# Patient Record
Sex: MALE | Race: WHITE | Employment: OTHER | URBAN - NONMETROPOLITAN AREA
[De-identification: names, ages, dates, MRNs, and addresses within clinical notes are randomized per-mention and may not be internally consistent; named-entity substitution may affect disease eponyms.]

---

## 2017-01-17 RX ORDER — ATENOLOL 25 MG/1
TABLET ORAL
Qty: 90 TABLET | Refills: 1 | Status: SHIPPED | OUTPATIENT
Start: 2017-01-17 | End: 2017-08-14 | Stop reason: SDUPTHER

## 2017-01-17 RX ORDER — AMLODIPINE BESYLATE 5 MG/1
TABLET ORAL
Qty: 180 TABLET | Refills: 1 | Status: SHIPPED | OUTPATIENT
Start: 2017-01-17 | End: 2017-08-14 | Stop reason: SDUPTHER

## 2017-04-25 ENCOUNTER — OFFICE VISIT (OUTPATIENT)
Dept: CARDIOLOGY | Age: 72
End: 2017-04-25

## 2017-04-25 VITALS
HEART RATE: 61 BPM | DIASTOLIC BLOOD PRESSURE: 80 MMHG | BODY MASS INDEX: 37.15 KG/M2 | HEIGHT: 65 IN | SYSTOLIC BLOOD PRESSURE: 134 MMHG | WEIGHT: 223 LBS

## 2017-04-25 DIAGNOSIS — I10 ESSENTIAL HYPERTENSION: Primary | ICD-10-CM

## 2017-04-25 DIAGNOSIS — I25.10 CORONARY ARTERY DISEASE INVOLVING NATIVE CORONARY ARTERY OF NATIVE HEART WITHOUT ANGINA PECTORIS: ICD-10-CM

## 2017-04-25 DIAGNOSIS — E78.00 PURE HYPERCHOLESTEROLEMIA: ICD-10-CM

## 2017-04-25 PROCEDURE — 99213 OFFICE O/P EST LOW 20 MIN: CPT | Performed by: INTERNAL MEDICINE

## 2017-04-25 PROCEDURE — 1123F ACP DISCUSS/DSCN MKR DOCD: CPT | Performed by: INTERNAL MEDICINE

## 2017-04-25 PROCEDURE — 4040F PNEUMOC VAC/ADMIN/RCVD: CPT | Performed by: INTERNAL MEDICINE

## 2017-04-25 PROCEDURE — G8427 DOCREV CUR MEDS BY ELIG CLIN: HCPCS | Performed by: INTERNAL MEDICINE

## 2017-04-25 PROCEDURE — 93000 ELECTROCARDIOGRAM COMPLETE: CPT | Performed by: INTERNAL MEDICINE

## 2017-04-25 PROCEDURE — 1036F TOBACCO NON-USER: CPT | Performed by: INTERNAL MEDICINE

## 2017-04-25 PROCEDURE — G8419 CALC BMI OUT NRM PARAM NOF/U: HCPCS | Performed by: INTERNAL MEDICINE

## 2017-04-25 PROCEDURE — 3017F COLORECTAL CA SCREEN DOC REV: CPT | Performed by: INTERNAL MEDICINE

## 2017-04-25 PROCEDURE — G8598 ASA/ANTIPLAT THER USED: HCPCS | Performed by: INTERNAL MEDICINE

## 2017-04-29 LAB
ALBUMIN SERPL-MCNC: 4.4 G/DL (ref 3.2–5.3)
ALK PHOSPHATASE: 81 IU/L (ref 35–121)
ALT SERPL-CCNC: 17 IU/L (ref 5–59)
ANION GAP SERPL CALCULATED.3IONS-SCNC: 11 MMOL/L
AST SERPL-CCNC: 15 IU/L (ref 10–42)
BILIRUB SERPL-MCNC: 0.5 MG/DL (ref 0.2–1.3)
BILIRUBIN DIRECT: 0.1 MG/DL (ref 0–0.2)
BUN BLDV-MCNC: 15 MG/DL (ref 10–20)
CALCIUM SERPL-MCNC: 9.6 MG/DL (ref 8.7–10.8)
CHLORIDE BLD-SCNC: 103 MMOL/L (ref 95–111)
CHOLESTEROL/HDL RATIO: 7.4
CHOLESTEROL: 297 MG/DL
CO2: 27 MMOL/L (ref 21–32)
CREAT SERPL-MCNC: 1 MG/DL (ref 0.5–1.3)
EGFR AFRICAN AMERICAN: 89
EGFR IF NONAFRICAN AMERICAN: 74
GLUCOSE: 87 MG/DL (ref 70–100)
HDLC SERPL-MCNC: 40 MG/DL (ref 40–60)
LDL CHOLESTEROL CALCULATED: 190 MG/DL
LDL/HDL RATIO: 4.8
POTASSIUM SERPL-SCNC: 4.3 MMOL/L (ref 3.5–5.4)
SODIUM BLD-SCNC: 137 MMOL/L (ref 134–147)
TOTAL PROTEIN: 7.2 G/DL (ref 5.8–8)
TRIGL SERPL-MCNC: 333 MG/DL
VLDLC SERPL CALC-MCNC: 67 MG/DL

## 2017-05-02 ENCOUNTER — TELEPHONE (OUTPATIENT)
Dept: CARDIOLOGY | Age: 72
End: 2017-05-02

## 2017-08-14 RX ORDER — AMLODIPINE BESYLATE 5 MG/1
TABLET ORAL
Qty: 180 TABLET | Refills: 1 | Status: SHIPPED | OUTPATIENT
Start: 2017-08-14 | End: 2018-01-13 | Stop reason: SDUPTHER

## 2017-08-14 RX ORDER — ATENOLOL 25 MG/1
TABLET ORAL
Qty: 90 TABLET | Refills: 1 | Status: SHIPPED | OUTPATIENT
Start: 2017-08-14 | End: 2017-08-31 | Stop reason: ALTCHOICE

## 2018-01-15 RX ORDER — AMLODIPINE BESYLATE 5 MG/1
TABLET ORAL
Qty: 180 TABLET | Refills: 0 | Status: SHIPPED | OUTPATIENT
Start: 2018-01-15 | End: 2018-03-23 | Stop reason: SDUPTHER

## 2018-02-28 ENCOUNTER — HOSPITAL ENCOUNTER (OUTPATIENT)
Dept: NON INVASIVE DIAGNOSTICS | Age: 73
Discharge: HOME | End: 2018-02-28
Payer: MEDICARE

## 2018-02-28 VITALS — BODY MASS INDEX: 37.49 KG/M2 | HEIGHT: 65 IN | WEIGHT: 225 LBS

## 2018-02-28 PROCEDURE — 78452 HT MUSCLE IMAGE SPECT MULT: CPT

## 2018-02-28 PROCEDURE — A9500 TC99M SESTAMIBI: HCPCS | Performed by: FAMILY MEDICINE

## 2018-02-28 PROCEDURE — 3430000000 HC RX DIAGNOSTIC RADIOPHARMACEUTICAL: Performed by: FAMILY MEDICINE

## 2018-02-28 PROCEDURE — 6360000002 HC RX W HCPCS

## 2018-02-28 PROCEDURE — 93017 CV STRESS TEST TRACING ONLY: CPT | Performed by: INTERNAL MEDICINE

## 2018-02-28 RX ADMIN — Medication 9.5 MILLICURIE: at 10:28

## 2018-02-28 RX ADMIN — Medication 31 MILLICURIE: at 11:50

## 2018-03-01 ENCOUNTER — OFFICE VISIT (OUTPATIENT)
Dept: CARDIOLOGY CLINIC | Age: 73
End: 2018-03-01
Payer: MEDICARE

## 2018-03-01 VITALS
DIASTOLIC BLOOD PRESSURE: 76 MMHG | HEIGHT: 65 IN | BODY MASS INDEX: 37.72 KG/M2 | HEART RATE: 67 BPM | WEIGHT: 226.38 LBS | SYSTOLIC BLOOD PRESSURE: 109 MMHG

## 2018-03-01 DIAGNOSIS — R06.09 DOE (DYSPNEA ON EXERTION): Primary | ICD-10-CM

## 2018-03-01 DIAGNOSIS — R60.0 LOWER EXTREMITY EDEMA: ICD-10-CM

## 2018-03-01 PROCEDURE — 3017F COLORECTAL CA SCREEN DOC REV: CPT | Performed by: INTERNAL MEDICINE

## 2018-03-01 PROCEDURE — G8417 CALC BMI ABV UP PARAM F/U: HCPCS | Performed by: INTERNAL MEDICINE

## 2018-03-01 PROCEDURE — 4040F PNEUMOC VAC/ADMIN/RCVD: CPT | Performed by: INTERNAL MEDICINE

## 2018-03-01 PROCEDURE — G8484 FLU IMMUNIZE NO ADMIN: HCPCS | Performed by: INTERNAL MEDICINE

## 2018-03-01 PROCEDURE — 99214 OFFICE O/P EST MOD 30 MIN: CPT | Performed by: INTERNAL MEDICINE

## 2018-03-01 PROCEDURE — G8598 ASA/ANTIPLAT THER USED: HCPCS | Performed by: INTERNAL MEDICINE

## 2018-03-01 PROCEDURE — 1036F TOBACCO NON-USER: CPT | Performed by: INTERNAL MEDICINE

## 2018-03-01 PROCEDURE — G8427 DOCREV CUR MEDS BY ELIG CLIN: HCPCS | Performed by: INTERNAL MEDICINE

## 2018-03-01 PROCEDURE — 1123F ACP DISCUSS/DSCN MKR DOCD: CPT | Performed by: INTERNAL MEDICINE

## 2018-03-01 RX ORDER — FUROSEMIDE 40 MG/1
40 TABLET ORAL DAILY
Qty: 60 TABLET | Refills: 3 | Status: SHIPPED | OUTPATIENT
Start: 2018-03-01 | End: 2018-04-11 | Stop reason: ALTCHOICE

## 2018-03-01 NOTE — PROGRESS NOTES
[facility] PROFESSIONAL SERVS  [facility] [address] [address]  Dept: [phone]  Dept Fax: [phone]  Loc: [phone]    Visit Date: 3/1/2018    Mr. Russel Moon is a 71 y.o. male  who presented for:  Chief Complaint   Patient presents with    Check-Up    Hypertension    Coronary Artery Disease       HPI:   HPI   70 yo M hx of non-obstructive CAD 2012, HTN, HLD who was previously being treated with inhalers, but is now off; had a stress test yesterday that was negative for ischemia. /76, HR 67. ASA/BB/CCB/NTG SL. No angina. No LOC. No palpitations. Apparently had an atherectomy of his \"widowmaker\" -  30 years ago. His EF is 72% on the Lexiscan, but no recent TTE. No chest pain, angina, DELACRUZ, orthopnea, PND, sob at rest, palpitations, LE edema, or syncope. Current Outpatient Prescriptions:     amLODIPine (NORVASC) 5 MG tablet, TAKE 1 TABLET TWICE DAILY, Disp: 180 tablet, Rfl: 0    metoprolol tartrate (LOPRESSOR) 25 MG tablet, Take 1 tablet by mouth 2 times daily, Disp: 180 tablet, Rfl: 3    nitroGLYCERIN (NITROSTAT) 0.4 MG SL tablet, Place 1 tablet under the tongue every 5 minutes as needed for Chest pain, Disp: 25 tablet, Rfl: 3    niacin 500 MG tablet, Take 500 mg by mouth 2 times daily (with meals), Disp: , Rfl:     finasteride (PROSCAR) 5 MG tablet, Take 1 tablet by mouth daily. , Disp: 30 tablet, Rfl: 6    terazosin (HYTRIN) 5 MG capsule, Take 1 capsule by mouth nightly., Disp: 30 capsule, Rfl: 6    aspirin 81 MG tablet, Take 81 mg by mouth daily. , Disp: , Rfl:     Naproxen Sodium (ALEVE PO), Take  by mouth as needed. , Disp: , Rfl:     Past Medical History  Mary Vo  has a past medical history of Arteriosclerotic heart disease; Bradycardia; Difficulty urinating; Fatigue; Frequency of urination; Hearing loss; Heartburn; Hyperlipidemia; Hypertension; and Leg cramps.     Social History  Mary Vo  reports that he quit smoking about 34 years ago. His smoking use included Cigarettes. He has a 80.00 pack-year smoking history. He has never used smokeless tobacco. He reports that he drinks alcohol. He reports that he does not use drugs. Family History  Peder Gilda family history includes Heart Disease in his father and mother; Heart Disease (age of onset: 43) in his brother; Heart Disease (age of onset: 50) in his brother; Heart Disease (age of onset: 62) in his brother; Heart Disease (age of onset: 79) in his brother; High Cholesterol in his brother, brother, brother, brother, father, and mother. There is no family history of bicuspid aortic valve, aneurysms, heart transplant, pacemakers, defibrillators, or sudden cardiac death. Past Surgical History   Past Surgical History:   Procedure Laterality Date    ANGIOPLASTY      ATHERECTOMY  1994    artherectomy of the LAD    CARDIAC CATHETERIZATION  8/2008    50-60% stenosis in the mid rt cornary artery which was a supradominant system, normal LV size EF 65%     CARDIAC CATHETERIZATION  8/11/08    50-60% stenosis in mid RCA. 40-50% proximal circumflex stenosis. normal LV size and systolic funct. EF 65%. nor regional wall motion abnormalities. no MR.    CARDIOVASCULAR STRESS TEST  8-6-09    neg for myocardial ischemia. no evidence of induced ischemia, infarct, or scar. normal wall motion/contractility. EF 66%. no CP or ST segment chg w/admin of Persantine.  CARDIOVASCULAR STRESS TEST  9-15-11    neg Persantine stress ryanne for myocardial ischemia. no evidence of induced ischemia,infact, or scar. normal wall motion/contractility. EF 70%. no CP or EKG chg w/admin of Persantine.  CARDIOVASCULAR STRESS TEST  9/9/10    neg Persantine stress test for myocardial ischemia. no evidence of induced ischemia,infarct, or scar. normal wall motion/contractility. EF 75%. no CP or ischemic EKG chg w/admin of Persantine.     CARDIOVASCULAR STRESS TEST  12/4/07    normal cardiolite myocardial scan w/spect imaging. LV EF 66%.  CARDIOVASCULAR STRESS TEST  7/21/05    no evidence of induced myocardial ischemia. normal LV. EF 62%. normal LV systolic volume. no myocardial scars or wall motion abnormalities.  CARDIOVASCULAR STRESS TEST  7/20/04    neg for myocardial ischemia. no evidence of ischemia,infarct. EF 60%. neg graded exercise for myocardial ischemia to 91% predicted target hr. pt no CP or EKG chg. 12.9 MET workload achieved.  CARDIOVASCULAR STRESS TEST  6/18/02    normal cardiolite myocardial scan. no appreciable chg from 9-9-99. LV Ef 73%.  EKG 12 LEAD (HISTORICAL)  8-10-10    shows sinus bracycardia, minimal voltage criteria for LV hypertrophy, non specific T-wave changes    HERNIA REPAIR      PTCA  2000    PTCA of a lg dominant circumflex. severe CAD. LV EF 59%.  TRANSTHORACIC ECHOCARDIOGRAM  8/12/09    normal LV and wall motion. EF 55%. both atria are normal in size. valvular structures are grossly within limits w/o stenosis. gross intracardic thrombi or mass were not noted. mild M&T  and pulmonic insufficencies. valvular stenosis not substantiated. no sign pericardial effusion. Subjective:     Review of Systems   Constitutional: Negative for activity change, appetite change, chills and fatigue. HENT: Negative for congestion, sinus pressure, sneezing and sore throat. Eyes: Negative for pain, discharge, redness and itching. Respiratory: Negative for apnea, cough, chest tightness and shortness of breath. Gastrointestinal: Negative for abdominal distention, abdominal pain, blood in stool, constipation, diarrhea and nausea. Endocrine: Negative for cold intolerance, heat intolerance, polydipsia and polyphagia. Genitourinary: Negative for dysuria, enuresis, flank pain and hematuria. Musculoskeletal: Negative for arthralgias, back pain, joint swelling and neck pain. Neurological: Negative for dizziness, syncope, numbness and headaches.    Psychiatric/Behavioral: Negative for agitation, confusion, decreased concentration and dysphoric mood. Objective:     /76   Pulse 67   Ht 5' 5\" (1.651 m)   Wt 226 lb 6 oz (102.7 kg)   BMI 37.67 kg/m²     Wt Readings from Last 3 Encounters:   03/01/18 226 lb 6 oz (102.7 kg)   02/28/18 225 lb (102.1 kg)   04/25/17 223 lb (101.2 kg)     BP Readings from Last 3 Encounters:   03/01/18 109/76   04/25/17 134/80   06/28/16 116/68     Physical Exam   Constitutional: He is oriented to person, place, and time. He appears well-developed and well-nourished. HENT:   Head: Normocephalic and atraumatic. Eyes: EOM are normal. Pupils are equal, round, and reactive to light. Neck: Normal range of motion. Neck supple. No JVD present. Cardiovascular: Normal rate, regular rhythm, normal heart sounds and intact distal pulses. No murmur heard. Pulmonary/Chest: Effort normal and breath sounds normal. No respiratory distress. He has no wheezes. He has no rales. Abdominal: Soft. Bowel sounds are normal. He exhibits no distension. There is no tenderness. Musculoskeletal: Normal range of motion. He exhibits no edema. Neurological: He is alert and oriented to person, place, and time. No cranial nerve deficit. Coordination normal.   Skin: Skin is warm and dry. Psychiatric: He has a normal mood and affect. Nursing note and vitals reviewed.       No results found for: CKTOTAL, CKMB, CKMBINDEX    Lab Results   Component Value Date    WBC 6.3 02/13/2012    RBC 4.78 02/13/2012    HCT 42.4 02/13/2012    MCV 88.7 02/13/2012    MCH 30.9 02/13/2012    MCHC 34.9 02/13/2012    RDW 13.6 02/13/2012     02/13/2012    MPV 11.0 02/13/2012       Lab Results   Component Value Date     04/29/2017    K 4.3 04/29/2017     04/29/2017    CO2 27 04/29/2017    BUN 15 04/29/2017    LABALBU 4.4 04/29/2017    LABALBU 4.3 02/13/2012    CREATININE 1.0 04/29/2017    CALCIUM 9.6 04/29/2017    LABGLOM 75 02/13/2012    GLUCOSE 87 04/29/2017       Lab Results Component Value Date    ALKPHOS 81 04/29/2017    ALKPHOS 83 07/09/2016    ALT 17 04/29/2017    AST 15 04/29/2017    PROT 7.2 04/29/2017    PROT 7.1 07/09/2016    BILITOT 0.5 04/29/2017    BILIDIR 0.1 04/29/2017    LABALBU 4.4 04/29/2017    LABALBU 4.3 02/13/2012       No results found for: MG    No results found for: INR, PROTIME      No results found for: LABA1C    Lab Results   Component Value Date    TRIG 333 04/29/2017    HDL 40 04/29/2017    LDLCALC 190 04/29/2017    LDLDIRECT 196 12/12/2015    LABVLDL 67 04/29/2017       No results found for: TSH        Assessment/Plan   DELACRUZ - check TTE to evaluate function, valves, pulmonary pressures. May need repeat Pulmonology assessment. Lasix 40 mg q day. BMP in 1 week. The patient was advised on risk/benefits of the new Rx and she agreed to proceed with the medication(s). Will likely need to see CHF RN.       Disposition:  2-4 weeks    Electronically signed by Sami Collado MD   3/1/2018 at 2:28 PM

## 2018-03-05 ASSESSMENT — ENCOUNTER SYMPTOMS
EYE REDNESS: 0
DIARRHEA: 0
BACK PAIN: 0
ABDOMINAL DISTENTION: 0
BLOOD IN STOOL: 0
SINUS PRESSURE: 0
COUGH: 0
EYE PAIN: 0
CONSTIPATION: 0
EYE ITCHING: 0
EYE DISCHARGE: 0
SORE THROAT: 0
CHEST TIGHTNESS: 0
ABDOMINAL PAIN: 0
NAUSEA: 0
APNEA: 0
SHORTNESS OF BREATH: 0

## 2018-03-14 ENCOUNTER — HOSPITAL ENCOUNTER (OUTPATIENT)
Dept: NON INVASIVE DIAGNOSTICS | Age: 73
Discharge: HOME | End: 2018-03-14
Payer: MEDICARE

## 2018-03-14 DIAGNOSIS — R60.0 LOWER EXTREMITY EDEMA: ICD-10-CM

## 2018-03-14 LAB
LV EF: 63 %
LVEF MODALITY: NORMAL

## 2018-03-14 PROCEDURE — 93306 TTE W/DOPPLER COMPLETE: CPT

## 2018-03-23 RX ORDER — AMLODIPINE BESYLATE 5 MG/1
TABLET ORAL
Qty: 180 TABLET | Refills: 0 | Status: SHIPPED | OUTPATIENT
Start: 2018-03-23 | End: 2018-03-27 | Stop reason: SDUPTHER

## 2018-03-29 RX ORDER — AMLODIPINE BESYLATE 5 MG/1
TABLET ORAL
Qty: 180 TABLET | Refills: 0 | Status: SHIPPED | OUTPATIENT
Start: 2018-03-29 | End: 2018-06-04 | Stop reason: SDUPTHER

## 2018-04-09 ENCOUNTER — OFFICE VISIT (OUTPATIENT)
Dept: CARDIOLOGY CLINIC | Age: 73
End: 2018-04-09
Payer: MEDICARE

## 2018-04-09 ENCOUNTER — TELEPHONE (OUTPATIENT)
Dept: CARDIOLOGY CLINIC | Age: 73
End: 2018-04-09

## 2018-04-09 VITALS
WEIGHT: 225 LBS | HEART RATE: 60 BPM | SYSTOLIC BLOOD PRESSURE: 110 MMHG | DIASTOLIC BLOOD PRESSURE: 68 MMHG | BODY MASS INDEX: 37.49 KG/M2 | HEIGHT: 65 IN

## 2018-04-09 DIAGNOSIS — I50.32 CHRONIC DIASTOLIC CONGESTIVE HEART FAILURE, NYHA CLASS 2 (HCC): Primary | ICD-10-CM

## 2018-04-09 PROCEDURE — 3017F COLORECTAL CA SCREEN DOC REV: CPT | Performed by: INTERNAL MEDICINE

## 2018-04-09 PROCEDURE — G8427 DOCREV CUR MEDS BY ELIG CLIN: HCPCS | Performed by: INTERNAL MEDICINE

## 2018-04-09 PROCEDURE — 1123F ACP DISCUSS/DSCN MKR DOCD: CPT | Performed by: INTERNAL MEDICINE

## 2018-04-09 PROCEDURE — 4040F PNEUMOC VAC/ADMIN/RCVD: CPT | Performed by: INTERNAL MEDICINE

## 2018-04-09 PROCEDURE — G8598 ASA/ANTIPLAT THER USED: HCPCS | Performed by: INTERNAL MEDICINE

## 2018-04-09 PROCEDURE — 99213 OFFICE O/P EST LOW 20 MIN: CPT | Performed by: INTERNAL MEDICINE

## 2018-04-09 PROCEDURE — G8417 CALC BMI ABV UP PARAM F/U: HCPCS | Performed by: INTERNAL MEDICINE

## 2018-04-09 PROCEDURE — 1036F TOBACCO NON-USER: CPT | Performed by: INTERNAL MEDICINE

## 2018-04-09 RX ORDER — FUROSEMIDE 40 MG/1
40 TABLET ORAL 2 TIMES DAILY
Qty: 60 TABLET | Refills: 3 | Status: SHIPPED | OUTPATIENT
Start: 2018-04-09 | End: 2018-07-30

## 2018-04-09 ASSESSMENT — ENCOUNTER SYMPTOMS
CONSTIPATION: 0
NAUSEA: 0
SINUS PRESSURE: 0
EYE PAIN: 0
ABDOMINAL PAIN: 0
BLOOD IN STOOL: 0
ABDOMINAL DISTENTION: 0
CHEST TIGHTNESS: 0
EYE REDNESS: 0
SORE THROAT: 0
COUGH: 0
SHORTNESS OF BREATH: 0
EYE DISCHARGE: 0
BACK PAIN: 0
DIARRHEA: 0
EYE ITCHING: 0
APNEA: 0

## 2018-04-11 ENCOUNTER — OFFICE VISIT (OUTPATIENT)
Dept: CARDIOLOGY CLINIC | Age: 73
End: 2018-04-11
Payer: MEDICARE

## 2018-04-11 VITALS
HEIGHT: 64 IN | HEART RATE: 66 BPM | WEIGHT: 228 LBS | OXYGEN SATURATION: 96 % | DIASTOLIC BLOOD PRESSURE: 82 MMHG | SYSTOLIC BLOOD PRESSURE: 138 MMHG | BODY MASS INDEX: 38.93 KG/M2

## 2018-04-11 DIAGNOSIS — I50.32 CHF (CONGESTIVE HEART FAILURE), NYHA CLASS II, CHRONIC, DIASTOLIC (HCC): Primary | ICD-10-CM

## 2018-04-11 PROCEDURE — 99213 OFFICE O/P EST LOW 20 MIN: CPT | Performed by: NURSE PRACTITIONER

## 2018-04-11 PROCEDURE — 1123F ACP DISCUSS/DSCN MKR DOCD: CPT | Performed by: NURSE PRACTITIONER

## 2018-04-11 PROCEDURE — G8598 ASA/ANTIPLAT THER USED: HCPCS | Performed by: NURSE PRACTITIONER

## 2018-04-11 PROCEDURE — 1036F TOBACCO NON-USER: CPT | Performed by: NURSE PRACTITIONER

## 2018-04-11 PROCEDURE — 3017F COLORECTAL CA SCREEN DOC REV: CPT | Performed by: NURSE PRACTITIONER

## 2018-04-11 PROCEDURE — G8427 DOCREV CUR MEDS BY ELIG CLIN: HCPCS | Performed by: NURSE PRACTITIONER

## 2018-04-11 PROCEDURE — 4040F PNEUMOC VAC/ADMIN/RCVD: CPT | Performed by: NURSE PRACTITIONER

## 2018-04-11 PROCEDURE — G8417 CALC BMI ABV UP PARAM F/U: HCPCS | Performed by: NURSE PRACTITIONER

## 2018-04-11 RX ORDER — POTASSIUM CHLORIDE 750 MG/1
10 TABLET, EXTENDED RELEASE ORAL DAILY
Qty: 60 TABLET | Refills: 3 | Status: SHIPPED | OUTPATIENT
Start: 2018-04-11 | End: 2018-08-13 | Stop reason: SDUPTHER

## 2018-04-11 RX ORDER — LISINOPRIL 2.5 MG/1
2.5 TABLET ORAL DAILY
Qty: 30 TABLET | Refills: 3 | Status: SHIPPED | OUTPATIENT
Start: 2018-04-11 | End: 2018-07-26

## 2018-04-11 ASSESSMENT — ENCOUNTER SYMPTOMS
COUGH: 0
ABDOMINAL PAIN: 0
CHEST TIGHTNESS: 0
SHORTNESS OF BREATH: 1
WHEEZING: 0
APNEA: 0
COLOR CHANGE: 0
ABDOMINAL DISTENTION: 1
NAUSEA: 0

## 2018-05-01 ENCOUNTER — HOSPITAL ENCOUNTER (OUTPATIENT)
Dept: PULMONOLOGY | Age: 73
Discharge: HOME | End: 2018-05-01
Payer: MEDICARE

## 2018-05-01 PROCEDURE — 94060 EVALUATION OF WHEEZING: CPT

## 2018-05-01 PROCEDURE — 94729 DIFFUSING CAPACITY: CPT

## 2018-05-01 PROCEDURE — 94726 PLETHYSMOGRAPHY LUNG VOLUMES: CPT

## 2018-05-23 ENCOUNTER — OFFICE VISIT (OUTPATIENT)
Dept: CARDIOLOGY CLINIC | Age: 73
End: 2018-05-23
Payer: MEDICARE

## 2018-05-23 VITALS
WEIGHT: 216.8 LBS | DIASTOLIC BLOOD PRESSURE: 80 MMHG | HEART RATE: 60 BPM | SYSTOLIC BLOOD PRESSURE: 130 MMHG | HEIGHT: 65 IN | BODY MASS INDEX: 36.12 KG/M2

## 2018-05-23 DIAGNOSIS — I50.32 CHRONIC DIASTOLIC CONGESTIVE HEART FAILURE, NYHA CLASS 2 (HCC): ICD-10-CM

## 2018-05-23 DIAGNOSIS — R06.02 SOB (SHORTNESS OF BREATH): Primary | ICD-10-CM

## 2018-05-23 PROCEDURE — G8598 ASA/ANTIPLAT THER USED: HCPCS | Performed by: INTERNAL MEDICINE

## 2018-05-23 PROCEDURE — G8427 DOCREV CUR MEDS BY ELIG CLIN: HCPCS | Performed by: INTERNAL MEDICINE

## 2018-05-23 PROCEDURE — 3017F COLORECTAL CA SCREEN DOC REV: CPT | Performed by: INTERNAL MEDICINE

## 2018-05-23 PROCEDURE — 99213 OFFICE O/P EST LOW 20 MIN: CPT | Performed by: INTERNAL MEDICINE

## 2018-05-23 PROCEDURE — G8417 CALC BMI ABV UP PARAM F/U: HCPCS | Performed by: INTERNAL MEDICINE

## 2018-05-23 PROCEDURE — 1123F ACP DISCUSS/DSCN MKR DOCD: CPT | Performed by: INTERNAL MEDICINE

## 2018-05-23 PROCEDURE — 4040F PNEUMOC VAC/ADMIN/RCVD: CPT | Performed by: INTERNAL MEDICINE

## 2018-05-23 PROCEDURE — 1036F TOBACCO NON-USER: CPT | Performed by: INTERNAL MEDICINE

## 2018-05-28 ASSESSMENT — ENCOUNTER SYMPTOMS
ABDOMINAL PAIN: 0
EYE REDNESS: 0
ABDOMINAL DISTENTION: 0
EYE PAIN: 0
EYE ITCHING: 0
SHORTNESS OF BREATH: 0
COUGH: 0
SORE THROAT: 0
CHEST TIGHTNESS: 0
EYE DISCHARGE: 0
BLOOD IN STOOL: 0
DIARRHEA: 0
APNEA: 0
SINUS PRESSURE: 0
NAUSEA: 0
CONSTIPATION: 0
BACK PAIN: 0

## 2018-06-05 RX ORDER — AMLODIPINE BESYLATE 5 MG/1
TABLET ORAL
Qty: 180 TABLET | Refills: 0 | Status: SHIPPED | OUTPATIENT
Start: 2018-06-05 | End: 2018-07-26 | Stop reason: DRUGHIGH

## 2018-06-12 ENCOUNTER — OFFICE VISIT (OUTPATIENT)
Dept: CARDIOLOGY CLINIC | Age: 73
End: 2018-06-12
Payer: MEDICARE

## 2018-06-12 VITALS
HEIGHT: 65 IN | OXYGEN SATURATION: 92 % | WEIGHT: 214 LBS | DIASTOLIC BLOOD PRESSURE: 62 MMHG | SYSTOLIC BLOOD PRESSURE: 94 MMHG | HEART RATE: 75 BPM | BODY MASS INDEX: 35.65 KG/M2

## 2018-06-12 DIAGNOSIS — I50.32 CHF (CONGESTIVE HEART FAILURE), NYHA CLASS II, CHRONIC, DIASTOLIC (HCC): Primary | ICD-10-CM

## 2018-06-12 PROCEDURE — 1036F TOBACCO NON-USER: CPT | Performed by: NURSE PRACTITIONER

## 2018-06-12 PROCEDURE — G8417 CALC BMI ABV UP PARAM F/U: HCPCS | Performed by: NURSE PRACTITIONER

## 2018-06-12 PROCEDURE — 1123F ACP DISCUSS/DSCN MKR DOCD: CPT | Performed by: NURSE PRACTITIONER

## 2018-06-12 PROCEDURE — 99213 OFFICE O/P EST LOW 20 MIN: CPT | Performed by: NURSE PRACTITIONER

## 2018-06-12 PROCEDURE — 4040F PNEUMOC VAC/ADMIN/RCVD: CPT | Performed by: NURSE PRACTITIONER

## 2018-06-12 PROCEDURE — 3017F COLORECTAL CA SCREEN DOC REV: CPT | Performed by: NURSE PRACTITIONER

## 2018-06-12 PROCEDURE — G8598 ASA/ANTIPLAT THER USED: HCPCS | Performed by: NURSE PRACTITIONER

## 2018-06-12 PROCEDURE — G8427 DOCREV CUR MEDS BY ELIG CLIN: HCPCS | Performed by: NURSE PRACTITIONER

## 2018-06-12 ASSESSMENT — ENCOUNTER SYMPTOMS
WHEEZING: 0
APNEA: 0
ABDOMINAL PAIN: 0
COUGH: 0
ABDOMINAL DISTENTION: 1
COLOR CHANGE: 0
SHORTNESS OF BREATH: 1
CHEST TIGHTNESS: 0
NAUSEA: 0

## 2018-07-09 ENCOUNTER — TELEPHONE (OUTPATIENT)
Dept: CARDIOLOGY CLINIC | Age: 73
End: 2018-07-09

## 2018-07-09 NOTE — TELEPHONE ENCOUNTER
Spoke to Jerald Paris and updated her regarding medication changes. She states that she will call if he is still fatigue and if BP is >130.
Patient

## 2018-07-16 ENCOUNTER — TELEPHONE (OUTPATIENT)
Dept: CARDIAC REHAB | Age: 73
End: 2018-07-16

## 2018-07-16 NOTE — TELEPHONE ENCOUNTER
Left message with patients wife regarding 2 part nutrition class workshops for diastolic HF patients that will be starting this month. Openings available on August 6th and 13th. Wife states she will relay information to [place] and will call back if they will be able to come on those dates. Office number provided.

## 2018-07-19 ENCOUNTER — TELEPHONE (OUTPATIENT)
Dept: CARDIOLOGY CLINIC | Age: 73
End: 2018-07-19

## 2018-07-25 ENCOUNTER — TELEPHONE (OUTPATIENT)
Dept: CARDIOLOGY CLINIC | Age: 73
End: 2018-07-25

## 2018-07-25 DIAGNOSIS — I50.32 CHF (CONGESTIVE HEART FAILURE), NYHA CLASS II, CHRONIC, DIASTOLIC (HCC): Primary | ICD-10-CM

## 2018-07-25 NOTE — TELEPHONE ENCOUNTER
BMP today or tomorrow - order was placed  I would prefer him to take the Metoprolol. We can decrease to 12.5 mg bid, stop Lisinopril 2.5 mg daily, decrease Amlodipine to 5 mg daily. Decrease Lasix to 20 mg daily. We will see what his labs show and make further recommendations from there.  Drink to thirst.

## 2018-07-26 NOTE — TELEPHONE ENCOUNTER
Spoke to Mario Alberto Rizo regarding new orders and she verbalized understanding. She states that he will be getting his labs done today at [facility] in [place]. Orders faxed to the lab.

## 2018-07-27 LAB
ANION GAP SERPL CALCULATED.3IONS-SCNC: 19 MEQ/L (ref 10–19)
BUN BLDV-MCNC: 39 MG/DL (ref 8–23)
CALCIUM SERPL-MCNC: 9 MG/DL (ref 8.5–10.5)
CHLORIDE BLD-SCNC: 96 MEQ/L (ref 95–107)
CO2: 22 MEQ/L (ref 19–31)
CREAT SERPL-MCNC: 1.6 MG/DL (ref 0.8–1.4)
EGFR AFRICAN AMERICAN: 49.5 ML/MIN/1.73 M2
EGFR IF NONAFRICAN AMERICAN: 42.7 ML/MIN/1.73 M2
GLUCOSE: 87 MG/DL (ref 70–99)
POTASSIUM SERPL-SCNC: 4.4 MEQ/L (ref 3.5–5.4)
SODIUM BLD-SCNC: 137 MEQ/L (ref 135–146)

## 2018-08-07 LAB
ANION GAP SERPL CALCULATED.3IONS-SCNC: 13 MEQ/L (ref 10–19)
BUN BLDV-MCNC: 19 MG/DL (ref 8–23)
CALCIUM SERPL-MCNC: 9.2 MG/DL (ref 8.5–10.5)
CHLORIDE BLD-SCNC: 102 MEQ/L (ref 95–107)
CO2: 23 MEQ/L (ref 19–31)
CREAT SERPL-MCNC: 1.1 MG/DL (ref 0.8–1.4)
EGFR AFRICAN AMERICAN: 77.9 ML/MIN/1.73 M2
EGFR IF NONAFRICAN AMERICAN: 67.2 ML/MIN/1.73 M2
GLUCOSE: 92 MG/DL (ref 70–99)
POTASSIUM SERPL-SCNC: 4.6 MEQ/L (ref 3.5–5.4)
SODIUM BLD-SCNC: 138 MEQ/L (ref 135–146)

## 2018-08-07 RX ORDER — FUROSEMIDE 40 MG/1
40 TABLET ORAL DAILY
COMMUNITY
End: 2018-09-10 | Stop reason: SDUPTHER

## 2018-08-07 RX ORDER — AMLODIPINE BESYLATE 5 MG/1
5 TABLET ORAL DAILY
COMMUNITY
End: 2018-08-08 | Stop reason: SDUPTHER

## 2018-08-08 RX ORDER — AMLODIPINE BESYLATE 5 MG/1
TABLET ORAL
Qty: 180 TABLET | Refills: 1 | Status: SHIPPED | OUTPATIENT
Start: 2018-08-08 | End: 2018-07-26

## 2018-08-14 RX ORDER — POTASSIUM CHLORIDE 750 MG/1
TABLET, EXTENDED RELEASE ORAL
Qty: 90 TABLET | Refills: 0 | Status: SHIPPED | OUTPATIENT
Start: 2018-08-14

## 2018-08-21 ENCOUNTER — TELEPHONE (OUTPATIENT)
Dept: CARDIOLOGY CLINIC | Age: 73
End: 2018-08-21

## 2018-08-21 NOTE — TELEPHONE ENCOUNTER
Labs look good. You may let him know. Results for Xuan Hi (MRN [id number]) as of 8/21/2018 16:39   Ref.  Range 7/26/2018 13:06 8/6/2018 16:08   Sodium Latest Ref Range: 135 - 146 mEq/L 137 138   Potassium Latest Ref Range: 3.5 - 5.4 mEq/L 4.4 4.6   Chloride Latest Ref Range: 95 - 107 mEq/L 96 102   CO2 Latest Ref Range: 19 - 31 mEq/L 22 23   BUN Latest Ref Range: 8 - 23 mg/dL 39 (H) 19   Creatinine Latest Ref Range: 0.8 - 1.4 mg/dL 1.6 (H) 1.1   Anion Gap Latest Ref Range: 10 - 19 mEq/L 19 13   eGFR African American Latest Ref Range: >59 mL/min/1.73 m2 49.5 (L) 77.9   EGFR IF NonAfrican American Latest Ref Range: >59 mL/min/1.73 m2 42.7 (L) 67.2   Glucose Latest Ref Range: 70 - 99 mg/dL 87 92   Calcium Latest Ref Range: 8.5 - 10.5 mg/dL 9.0 9.2

## 2018-08-21 NOTE — TELEPHONE ENCOUNTER
Patient called in stating that he has been experiencing lower back pain that in intermittent x 1 week. He states that he wasn't sure if it was related to a pulled muscle or if it is kidney related. Baseline weight is 200, today he was 200, Monday was 201, Sunday was 199, and Saturday was 198. Patient denies SOB, cough, swelling, bloating, orthopnea, increase in fatigue, dizziness, bruising or redness to that area, or history of falls in the last week. Medication reviewed and states that he has continued to take his medication as prescribed. Recent BP's: 106/70, 116/85, and 97/70. Patient was curious about his labs he had on 8/6/18? It was asked if he had contacted his PCP regarding this pain issue. Patient states that he has not. Stated that this information would be given to [facility] and that it the pain should be addressed with Dr. Tesha Currie.

## 2018-09-10 ENCOUNTER — OFFICE VISIT (OUTPATIENT)
Dept: CARDIOLOGY CLINIC | Age: 73
End: 2018-09-10
Payer: MEDICARE

## 2018-09-10 VITALS
BODY MASS INDEX: 34.32 KG/M2 | OXYGEN SATURATION: 94 % | WEIGHT: 206 LBS | HEART RATE: 62 BPM | HEIGHT: 65 IN | DIASTOLIC BLOOD PRESSURE: 79 MMHG | SYSTOLIC BLOOD PRESSURE: 120 MMHG

## 2018-09-10 DIAGNOSIS — I50.32 CHF (CONGESTIVE HEART FAILURE), NYHA CLASS II, CHRONIC, DIASTOLIC (HCC): Primary | ICD-10-CM

## 2018-09-10 PROCEDURE — 99213 OFFICE O/P EST LOW 20 MIN: CPT | Performed by: NURSE PRACTITIONER

## 2018-09-10 RX ORDER — AMLODIPINE BESYLATE 5 MG/1
5 TABLET ORAL DAILY
COMMUNITY

## 2018-09-10 RX ORDER — FUROSEMIDE 40 MG/1
40 TABLET ORAL DAILY
Qty: 90 TABLET | Refills: 6 | Status: CANCELLED | OUTPATIENT
Start: 2018-09-10

## 2018-09-10 RX ORDER — FUROSEMIDE 40 MG/1
40 TABLET ORAL DAILY
Qty: 30 TABLET | Refills: 3 | Status: SHIPPED | OUTPATIENT
Start: 2018-09-10

## 2018-09-10 ASSESSMENT — ENCOUNTER SYMPTOMS
COUGH: 0
ABDOMINAL PAIN: 0
CHEST TIGHTNESS: 0
SHORTNESS OF BREATH: 0
RESPIRATORY NEGATIVE: 1
ABDOMINAL DISTENTION: 0

## 2018-09-10 NOTE — PROGRESS NOTES
Heart Failure Clinic       Visit Date: 9/10/2018  Cardiologist:  Dr. Selina Anaya  Primary Care Physician: Dr. Moris Zhu MD    Zaire Christine is a 68 y.o. male who presents today for:  Chief Complaint   Patient presents with    Congestive Heart Failure       HPI:   Zaire Christine is a 68 y.o. male who presents to the office for a follow up  visit in the heart failure clinic. He has no CHF c/o  Not adding any salt - his wife cooks for him - weight has been coming down - he is eating well (10 # weight loss since May)    No Le swelling issues - no needed extra diuretics       He did have episode of lightheadedness and low BP - meds were adjusted and he has been doing great since     Still works PT    Patient has:  Last hospital admission r/t Heart Failure:  never  Chest Pain: none  Worsening SOB/orthopnea/PND: no  Edema: none  Weight gain: none   Fatigue: none  Abdominal bloating: none  Appetite: good  Difficulty sleeping: none  No SHANNON  Cough: no  Any extra diuretic use: no  Compliant checking home weight: yes  Compliant checking blood pressure: yes  Any refills on CHF medications needed at this time: lasix 40 mg po day    Past Medical History:   Diagnosis Date    Arteriosclerotic heart disease     Bradycardia     [facility]     Congenital heart disease     Difficulty urinating     Fatigue     Frequency of urination     Hearing loss     Heartburn     Hyperlipidemia     Hypertension     Leg cramps      Past Surgical History:   Procedure Laterality Date    ANGIOPLASTY      ATHERECTOMY  1994    artherectomy of the LAD    CARDIAC CATHETERIZATION  8/2008    50-60% stenosis in the mid rt cornary artery which was a supradominant system, normal LV size EF 65%     CARDIAC CATHETERIZATION  8/11/08    50-60% stenosis in mid RCA. 40-50% proximal circumflex stenosis. normal LV size and systolic funct. EF 65%. nor regional wall motion abnormalities.  no MR. Mallory Newman CARDIOVASCULAR STRESS TEST  8-6-09    neg for myocardial ischemia. no evidence of induced ischemia, infarct, or scar. normal wall motion/contractility. EF 66%. no CP or ST segment chg w/admin of Persantine.  CARDIOVASCULAR STRESS TEST  9-15-11    neg Persantine stress ryanne for myocardial ischemia. no evidence of induced ischemia,infact, or scar. normal wall motion/contractility. EF 70%. no CP or EKG chg w/admin of Persantine.  CARDIOVASCULAR STRESS TEST  9/9/10    neg Persantine stress test for myocardial ischemia. no evidence of induced ischemia,infarct, or scar. normal wall motion/contractility. EF 75%. no CP or ischemic EKG chg w/admin of Persantine.  CARDIOVASCULAR STRESS TEST  12/4/07    normal cardiolite myocardial scan w/spect imaging. LV EF 66%.  CARDIOVASCULAR STRESS TEST  7/21/05    no evidence of induced myocardial ischemia. normal LV. EF 62%. normal LV systolic volume. no myocardial scars or wall motion abnormalities.  CARDIOVASCULAR STRESS TEST  7/20/04    neg for myocardial ischemia. no evidence of ischemia,infarct. EF 60%. neg graded exercise for myocardial ischemia to 91% predicted target hr. pt no CP or EKG chg. 12.9 MET workload achieved.  CARDIOVASCULAR STRESS TEST  6/18/02    normal cardiolite myocardial scan. no appreciable chg from 9-9-99. LV Ef 73%.  EKG 12 LEAD (HISTORICAL)  8-10-10    shows sinus bracycardia, minimal voltage criteria for LV hypertrophy, non specific T-wave changes    HERNIA REPAIR      PTCA  2000    PTCA of a lg dominant circumflex. severe CAD. LV EF 59%.  TRANSTHORACIC ECHOCARDIOGRAM  8/12/09    normal LV and wall motion. EF 55%. both atria are normal in size. valvular structures are grossly within limits w/o stenosis. gross intracardic thrombi or mass were not noted. mild M&T  and pulmonic insufficencies. valvular stenosis not substantiated. no sign pericardial effusion.      Family History   Problem Relation Age of Onset    Heart Disease Mother MI    High Cholesterol Mother     Heart Disease Father     High Cholesterol Father     Heart Disease Brother 43        MI/CABG     High Cholesterol Brother     Heart Disease Brother 50        CABG    High Cholesterol Brother     Heart Disease Brother 62        STENTS    High Cholesterol Brother     Heart Disease Brother 79        MI PTCA    High Cholesterol Brother      Social History   Substance Use Topics    Smoking status: Former Smoker     Packs/day: 4.00     Years: 20.00     Types: Cigarettes     Quit date: 1983    Smokeless tobacco: Never Used    Alcohol use Yes      Comment: social on the weekends 2-3 beers     Current Outpatient Prescriptions   Medication Sig Dispense Refill    amLODIPine (NORVASC) 5 MG tablet Take 5 mg by mouth daily      furosemide (LASIX) 40 MG tablet Take 1 tablet by mouth daily 30 tablet 3    potassium chloride (KLOR-CON M) 10 MEQ extended release tablet TAKE 1 TABLET EVERY DAY 90 tablet 0    metoprolol tartrate (LOPRESSOR) 25 MG tablet Take 1 tablet by mouth 2 times daily (Patient taking differently: Take 12.5 mg by mouth 2 times daily ) 180 tablet 3    nitroGLYCERIN (NITROSTAT) 0.4 MG SL tablet Place 1 tablet under the tongue every 5 minutes as needed for Chest pain 25 tablet 3    niacin 500 MG tablet Take 500 mg by mouth 2 times daily (with meals)      terazosin (HYTRIN) 5 MG capsule Take 1 capsule by mouth nightly. 30 capsule 6    aspirin 81 MG tablet Take 81 mg by mouth daily.  Naproxen Sodium (ALEVE PO) Take 1 tablet by mouth 2 times daily        No current facility-administered medications for this visit.       Allergies   Allergen Reactions    Bee Venom Swelling    Isosorbide Nitrate      Med intolerance    Lansoprazole      Med intolerance      Lipitor [Atorvastatin] Other (See Comments)     \"memory loss\"    Mevacor [Lovastatin]      Med intolerance      Nutritional Supplements     Zocor [Simvastatin]      Med intolerance SUBJECTIVE:   Review of Systems   Constitutional: Negative. Negative for activity change and appetite change. Respiratory: Negative. Negative for cough, chest tightness and shortness of breath. Cardiovascular: Negative. Negative for chest pain, palpitations and leg swelling. Gastrointestinal: Negative for abdominal distention and abdominal pain. Neurological: Negative. Negative for dizziness and light-headedness. Psychiatric/Behavioral: Negative. Negative for sleep disturbance. OBJECTIVE:   Today's Vitals:  /79   Pulse 62   Ht 5' 5\" (1.651 m)   Wt 206 lb (93.4 kg)   SpO2 94%   BMI 34.28 kg/m²     Physical Exam   Constitutional: He is oriented to person, place, and time. He appears well-developed and well-nourished. Cardiovascular: Normal rate, regular rhythm, normal heart sounds and intact distal pulses. Exam reveals no gallop. No murmur heard. Pulmonary/Chest: Effort normal and breath sounds normal. No respiratory distress. He has no wheezes. He exhibits no tenderness. Abdominal: Soft. Bowel sounds are normal. He exhibits no distension. There is no tenderness. Musculoskeletal: Normal range of motion. He exhibits no edema. Neurological: He is alert and oriented to person, place, and time. Psychiatric: He has a normal mood and affect. His behavior is normal. Judgment and thought content normal.       Wt Readings from Last 3 Encounters:   09/10/18 206 lb (93.4 kg)   06/12/18 214 lb (97.1 kg)   05/23/18 216 lb 12.8 oz (98.3 kg)     BP Readings from Last 3 Encounters:   09/10/18 120/79   06/12/18 94/62   05/23/18 130/80     Pulse Readings from Last 3 Encounters:   09/10/18 62   06/12/18 75   05/23/18 60     Body mass index is 34.28 kg/m².     ECHO:   Electronically signed by Joana Floyd MD (Interpreting  [phone]) on 03/15/2018 at 06:10 PM   ----------------------------------------------------------------      Findings      Mitral Valve   The mitral valve

## 2018-09-18 NOTE — PROGRESS NOTES
syndrome:NO  History of Seizures:NO  Sleep Walking:NO  Sleep Talking:NO  Sleep paralysis: NO  Cataplexy: NO      [place] Sleepiness Score:   Sitting and reading:3  Watching TV:3  Sitting inactive in a public place:0  Being a passenger in a motor vehicle for an hour or more:0  Lying down in the afternoon:3  Sitting and talking to someone:0  Sitting quietly after lunch (no alcohol):3  Stopped for a few minutes in traffic while drivin  Total KEITH:08      Social History:  Occupation:  He is current working: No  Type of profession: retired. He is still working as a small  and delivering auto parts. Retired: Yes. History of tobacco smoking:Yes  Amount of tobacco smokin.0 PPD. Years of tobacco smoking: 10.                                    Quit smoking: Yes. Quit smoking in . Current smoker: No.       History of recreational or IV drug use in the past:No     History of exposure to coal mines/coal dust: No  History of exposure to foundry dust/welding: No  History of exposure to quarry/silica/sandblasting: No  History of exposure to asbestos/working with breaks/ships: Yes for 10years. He used to work with break shoes. History of exposure to farm dust: No  History of recent travel to long distances: No  History of exposure to birds, pigeons, or chickens in the past:No  Pet animals at home:Yes  Dogs: 0  Cats: 1    History of pulmonary embolism in the past: No            History of DVT in the past:No                              Review of Systems:   General/Constitutional: He lost ~30lbs of weight voluntarily with normal appetite. No fever or chills. HENT: Negative. Eyes: Negative. Upper respiratory tract: No nasal stuffiness or post nasal drip. Lower respiratory tract/ lungs: No cough or sputum production. No hemoptysis. See HPI for dyspnea. Cardiovascular: No palpitations or chest pain. Gastrointestinal: No nausea or vomiting.   Neurological: No focal neurologiacal weakness. Extremities: No edema. Musculoskeletal: No complaints. Genitourinary: No complaints. Hematological: Negative. Psychiatric/Behavioral: Negative. Skin: No itching. Current Medications:          Past Medical History:   Diagnosis Date    Arteriosclerotic heart disease     Bradycardia     CHF (congestive heart failure) (HCC)     Congenital heart disease     Difficulty urinating     Fatigue     Frequency of urination     Hearing loss     Heartburn     Hyperlipidemia     Hypertension     Leg cramps        Past Surgical History:   Procedure Laterality Date    ANGIOPLASTY      ATHERECTOMY  1994    artherectomy of the LAD    CARDIAC CATHETERIZATION  8/2008    50-60% stenosis in the mid rt cornary artery which was a supradominant system, normal LV size EF 65%     CARDIAC CATHETERIZATION  8/11/08    50-60% stenosis in mid RCA. 40-50% proximal circumflex stenosis. normal LV size and systolic funct. EF 65%. nor regional wall motion abnormalities. no MR.    CARDIOVASCULAR STRESS TEST  8-6-09    neg for myocardial ischemia. no evidence of induced ischemia, infarct, or scar. normal wall motion/contractility. EF 66%. no CP or ST segment chg w/admin of Persantine.  CARDIOVASCULAR STRESS TEST  9-15-11    neg Persantine stress ryanne for myocardial ischemia. no evidence of induced ischemia,infact, or scar. normal wall motion/contractility. EF 70%. no CP or EKG chg w/admin of Persantine.  CARDIOVASCULAR STRESS TEST  9/9/10    neg Persantine stress test for myocardial ischemia. no evidence of induced ischemia,infarct, or scar. normal wall motion/contractility. EF 75%. no CP or ischemic EKG chg w/admin of Persantine.  CARDIOVASCULAR STRESS TEST  12/4/07    normal cardiolite myocardial scan w/spect imaging. LV EF 66%.  CARDIOVASCULAR STRESS TEST  7/21/05    no evidence of induced myocardial ischemia. normal LV. EF 62%. normal LV systolic volume.  no myocardial scars or wall

## 2018-09-25 ENCOUNTER — OFFICE VISIT (OUTPATIENT)
Dept: PULMONOLOGY | Age: 73
End: 2018-09-25
Payer: MEDICARE

## 2018-09-25 VITALS
DIASTOLIC BLOOD PRESSURE: 78 MMHG | HEIGHT: 65 IN | WEIGHT: 201.4 LBS | HEART RATE: 63 BPM | OXYGEN SATURATION: 97 % | SYSTOLIC BLOOD PRESSURE: 105 MMHG | BODY MASS INDEX: 33.55 KG/M2 | TEMPERATURE: 97.4 F

## 2018-09-25 DIAGNOSIS — R94.2 DECREASED DIFFUSION CAPACITY: ICD-10-CM

## 2018-09-25 DIAGNOSIS — I27.20 PULMONARY HTN (HCC): Primary | ICD-10-CM

## 2018-09-25 DIAGNOSIS — G47.10 HYPERSOMNIA: ICD-10-CM

## 2018-09-25 DIAGNOSIS — J98.4 RESTRICTIVE LUNG DISEASE: ICD-10-CM

## 2018-09-25 PROCEDURE — 1036F TOBACCO NON-USER: CPT | Performed by: INTERNAL MEDICINE

## 2018-09-25 PROCEDURE — G8417 CALC BMI ABV UP PARAM F/U: HCPCS | Performed by: INTERNAL MEDICINE

## 2018-09-25 PROCEDURE — 4040F PNEUMOC VAC/ADMIN/RCVD: CPT | Performed by: INTERNAL MEDICINE

## 2018-09-25 PROCEDURE — G8598 ASA/ANTIPLAT THER USED: HCPCS | Performed by: INTERNAL MEDICINE

## 2018-09-25 PROCEDURE — G8427 DOCREV CUR MEDS BY ELIG CLIN: HCPCS | Performed by: INTERNAL MEDICINE

## 2018-09-25 PROCEDURE — 3017F COLORECTAL CA SCREEN DOC REV: CPT | Performed by: INTERNAL MEDICINE

## 2018-09-25 PROCEDURE — 1123F ACP DISCUSS/DSCN MKR DOCD: CPT | Performed by: INTERNAL MEDICINE

## 2018-09-25 PROCEDURE — 1101F PT FALLS ASSESS-DOCD LE1/YR: CPT | Performed by: INTERNAL MEDICINE

## 2018-09-25 PROCEDURE — 99204 OFFICE O/P NEW MOD 45 MIN: CPT | Performed by: INTERNAL MEDICINE

## 2018-09-25 NOTE — PATIENT INSTRUCTIONS
Recommendations/Plan:  - Schedule patient for High resolution CT scan of chest without IV contrast in  months to evaluate for interstitial lung disease  - Schedule patient for nocturnal polysomnogram (Sleep study) at [facility] sleep lab. Patient educated to not to drive any motor vehicles or operate heavy equipment until sleep symptoms are under control. Patient verbalizes understanding. Patient to follow with my clinic at [address] [place] [address] 1week after sleep study.  - He was advised to closely follow with his cardiologist for optimization of CHF.  - Patient and his wife were educated about my impression and plan. Patient verbalizes understanding.  - Schedule patient for follow up with my clinic in 1month with recommended tests. Patient advised to make early appointment if needed.

## 2018-09-25 NOTE — PROGRESS NOTES
Neck Circumference -   18  Mallampati - 4    Lung Nodule Screening     [] Qualifies    [x] Does not qualify   [] Declined    [] Completed

## 2018-10-25 ENCOUNTER — HOSPITAL ENCOUNTER (OUTPATIENT)
Dept: CT IMAGING | Age: 73
Discharge: HOME | End: 2018-10-25
Payer: MEDICARE

## 2018-10-25 DIAGNOSIS — I27.20 PULMONARY HTN (HCC): ICD-10-CM

## 2018-10-25 DIAGNOSIS — J98.4 RESTRICTIVE LUNG DISEASE: ICD-10-CM

## 2018-10-25 DIAGNOSIS — G47.10 HYPERSOMNIA: ICD-10-CM

## 2018-10-25 DIAGNOSIS — R94.2 DECREASED DIFFUSION CAPACITY: ICD-10-CM

## 2018-10-25 PROCEDURE — 71250 CT THORAX DX C-: CPT

## 2018-10-29 ENCOUNTER — OFFICE VISIT (OUTPATIENT)
Dept: PULMONOLOGY | Age: 73
End: 2018-10-29
Payer: MEDICARE

## 2018-10-29 VITALS
HEART RATE: 56 BPM | HEIGHT: 65 IN | OXYGEN SATURATION: 98 % | TEMPERATURE: 96.9 F | DIASTOLIC BLOOD PRESSURE: 82 MMHG | WEIGHT: 201 LBS | BODY MASS INDEX: 33.49 KG/M2 | SYSTOLIC BLOOD PRESSURE: 126 MMHG

## 2018-10-29 DIAGNOSIS — J43.0 UNILATERAL EMPHYSEMA (HCC): ICD-10-CM

## 2018-10-29 DIAGNOSIS — J98.4 RESTRICTIVE LUNG DISEASE: ICD-10-CM

## 2018-10-29 DIAGNOSIS — I27.20 PULMONARY HTN (HCC): ICD-10-CM

## 2018-10-29 DIAGNOSIS — J47.9 BRONCHIECTASIS WITHOUT COMPLICATION (HCC): Primary | ICD-10-CM

## 2018-10-29 DIAGNOSIS — R94.2 DECREASED DIFFUSION CAPACITY: ICD-10-CM

## 2018-10-29 PROCEDURE — 3023F SPIROM DOC REV: CPT | Performed by: NURSE PRACTITIONER

## 2018-10-29 PROCEDURE — 1101F PT FALLS ASSESS-DOCD LE1/YR: CPT | Performed by: NURSE PRACTITIONER

## 2018-10-29 PROCEDURE — 99214 OFFICE O/P EST MOD 30 MIN: CPT | Performed by: NURSE PRACTITIONER

## 2018-10-29 PROCEDURE — 1036F TOBACCO NON-USER: CPT | Performed by: NURSE PRACTITIONER

## 2018-10-29 PROCEDURE — 4040F PNEUMOC VAC/ADMIN/RCVD: CPT | Performed by: NURSE PRACTITIONER

## 2018-10-29 PROCEDURE — G8417 CALC BMI ABV UP PARAM F/U: HCPCS | Performed by: NURSE PRACTITIONER

## 2018-10-29 PROCEDURE — G8427 DOCREV CUR MEDS BY ELIG CLIN: HCPCS | Performed by: NURSE PRACTITIONER

## 2018-10-29 PROCEDURE — 1123F ACP DISCUSS/DSCN MKR DOCD: CPT | Performed by: NURSE PRACTITIONER

## 2018-10-29 PROCEDURE — G8926 SPIRO NO PERF OR DOC: HCPCS | Performed by: NURSE PRACTITIONER

## 2018-10-29 PROCEDURE — 3017F COLORECTAL CA SCREEN DOC REV: CPT | Performed by: NURSE PRACTITIONER

## 2018-10-29 PROCEDURE — G8484 FLU IMMUNIZE NO ADMIN: HCPCS | Performed by: NURSE PRACTITIONER

## 2018-10-29 PROCEDURE — G8598 ASA/ANTIPLAT THER USED: HCPCS | Performed by: NURSE PRACTITIONER

## 2018-10-29 ASSESSMENT — ENCOUNTER SYMPTOMS
NAUSEA: 0
ALLERGIC/IMMUNOLOGIC NEGATIVE: 1
SHORTNESS OF BREATH: 1
BACK PAIN: 0
WHEEZING: 0
DIARRHEA: 0
STRIDOR: 0
CHEST TIGHTNESS: 0
COUGH: 0
EYES NEGATIVE: 1

## 2018-10-29 NOTE — PROGRESS NOTES
Smokeless tobacco: Never Used    Alcohol use Yes      Comment: social on the weekends 2-3 beers     ALLERGIES:  Allergies   Allergen Reactions    Bee Venom Swelling    Isosorbide Nitrate      Med intolerance    Lansoprazole      Med intolerance      Lipitor [Atorvastatin] Other (See Comments)     \"memory loss\"    Mevacor [Lovastatin]      Med intolerance      Nutritional Supplements     Zocor [Simvastatin]      Med intolerance     FAMILY HISTORY:  Family History   Problem Relation Age of Onset    Heart Disease Mother         MI    High Cholesterol Mother     Heart Disease Father     High Cholesterol Father     Heart Disease Brother 43        MI/CABG     High Cholesterol Brother     Heart Disease Brother 50        CABG    High Cholesterol Brother     Heart Disease Brother 62        STENTS    High Cholesterol Brother     Heart Disease Brother 79        MI PTCA    High Cholesterol Brother      CURRENT MEDICATIONS:  Current Outpatient Prescriptions   Medication Sig Dispense Refill    amLODIPine (NORVASC) 5 MG tablet Take 5 mg by mouth daily      furosemide (LASIX) 40 MG tablet Take 1 tablet by mouth daily 30 tablet 3    potassium chloride (KLOR-CON M) 10 MEQ extended release tablet TAKE 1 TABLET EVERY DAY 90 tablet 0    metoprolol tartrate (LOPRESSOR) 25 MG tablet Take 1 tablet by mouth 2 times daily (Patient taking differently: Take 12.5 mg by mouth 2 times daily ) 180 tablet 3    nitroGLYCERIN (NITROSTAT) 0.4 MG SL tablet Place 1 tablet under the tongue every 5 minutes as needed for Chest pain 25 tablet 3    niacin 500 MG tablet Take 500 mg by mouth 2 times daily (with meals)      terazosin (HYTRIN) 5 MG capsule Take 1 capsule by mouth nightly. 30 capsule 6    aspirin 81 MG tablet Take 81 mg by mouth daily.  Naproxen Sodium (ALEVE PO) Take 1 tablet by mouth 2 times daily        No current facility-administered medications for this visit. Clau HOWELL   Review of Systems

## 2018-12-04 ENCOUNTER — TELEPHONE (OUTPATIENT)
Dept: CARDIOLOGY CLINIC | Age: 73
End: 2018-12-04

## 2018-12-04 NOTE — TELEPHONE ENCOUNTER
Spoke with wife regarding canceled appointment for 12-13-18  She stated they are going through some things right now and does not want to reschedule any appointments

## 2019-01-04 ENCOUNTER — TELEPHONE (OUTPATIENT)
Dept: CARDIOLOGY CLINIC | Age: 74
End: 2019-01-04

## 2019-06-18 ENCOUNTER — HOSPITAL ENCOUNTER (OUTPATIENT)
Age: 74
Discharge: HOME | End: 2019-06-18
Payer: MEDICARE

## 2019-06-18 ENCOUNTER — HOSPITAL ENCOUNTER (OUTPATIENT)
Dept: GENERAL RADIOLOGY | Age: 74
Discharge: HOME | End: 2019-06-18
Payer: MEDICARE

## 2019-06-18 DIAGNOSIS — Z01.810 PRE-OPERATIVE CARDIOVASCULAR EXAMINATION: ICD-10-CM

## 2019-06-18 LAB
EKG ATRIAL RATE: 53 BPM
EKG P AXIS: 32 DEGREES
EKG P-R INTERVAL: 168 MS
EKG Q-T INTERVAL: 438 MS
EKG QRS DURATION: 96 MS
EKG QTC CALCULATION (BAZETT): 410 MS
EKG R AXIS: -43 DEGREES
EKG T AXIS: 16 DEGREES
EKG VENTRICULAR RATE: 53 BPM

## 2019-06-18 PROCEDURE — 71046 X-RAY EXAM CHEST 2 VIEWS: CPT

## 2019-06-18 PROCEDURE — 93005 ELECTROCARDIOGRAM TRACING: CPT | Performed by: FAMILY MEDICINE

## 2019-06-18 PROCEDURE — 93010 ELECTROCARDIOGRAM REPORT: CPT | Performed by: INTERNAL MEDICINE

## 2019-06-19 LAB
ABSOLUTE BASO #: 0.1 K/UL (ref 0–0.1)
ABSOLUTE EOS #: 0.2 K/UL (ref 0.1–0.4)
ABSOLUTE LYMPH #: 1.6 K/UL (ref 0.8–5.2)
ABSOLUTE MONO #: 0.6 K/UL (ref 0.1–0.9)
ABSOLUTE NEUT #: 4.2 K/UL (ref 1.3–9.1)
ANION GAP SERPL CALCULATED.3IONS-SCNC: 8 MMOL/L (ref 4–12)
BASOPHILS RELATIVE PERCENT: 0.9 %
BUN BLDV-MCNC: 20 MG/DL (ref 5–27)
CALCIUM SERPL-MCNC: 8.9 MG/DL (ref 8.5–10.5)
CHLORIDE BLD-SCNC: 104 MMOL/L (ref 98–109)
CO2: 27 MMOL/L (ref 22–32)
CREAT SERPL-MCNC: 1.32 MG/DL (ref 0.6–1.3)
EGFR AFRICAN AMERICAN: >60 ML/MIN/1.73SQ.M
EGFR IF NONAFRICAN AMERICAN: 53 ML/MIN/1.73SQ.M
EOSINOPHILS RELATIVE PERCENT: 2.7 %
GLUCOSE: 104 MG/DL (ref 65–99)
HCT VFR BLD CALC: 41.8 % (ref 41.4–51)
HEMOGLOBIN: 14.5 G/DL (ref 13.8–17)
LYMPHOCYTE %: 23.9 %
MCH RBC QN AUTO: 30.9 PG (ref 27–34)
MCHC RBC AUTO-ENTMCNC: 34.7 G/DL (ref 31–36)
MCV RBC AUTO: 89.1 FL (ref 80–100)
MONOCYTES # BLD: 9.5 %
NEUTROPHILS RELATIVE PERCENT: 62.5 %
PDW BLD-RTO: 12.7 % (ref 10.8–14.8)
PLATELETS: 155 K/UL (ref 150–450)
POTASSIUM SERPL-SCNC: 4.4 MMOL/L (ref 3.5–5)
RBC: 4.69 M/UL (ref 4–5.5)
SODIUM BLD-SCNC: 139 MMOL/L (ref 134–146)
WBC: 6.6 K/UL (ref 3.7–10.8)

## 2023-07-16 NOTE — ADDENDUM NOTE
Addended by: Patti Maharaj on: 7/30/2018 03:53 PM     Modules accepted: Orders
no loss of consciousness, no gait abnormality, no headache, no sensory deficits, and no weakness.